# Patient Record
Sex: MALE | Race: WHITE | NOT HISPANIC OR LATINO | URBAN - METROPOLITAN AREA
[De-identification: names, ages, dates, MRNs, and addresses within clinical notes are randomized per-mention and may not be internally consistent; named-entity substitution may affect disease eponyms.]

---

## 2024-03-14 ENCOUNTER — OFFICE VISIT (OUTPATIENT)
Dept: PEDIATRICS | Facility: CLINIC | Age: 24
End: 2024-03-14
Payer: COMMERCIAL

## 2024-03-14 VITALS
HEART RATE: 60 BPM | WEIGHT: 244 LBS | SYSTOLIC BLOOD PRESSURE: 133 MMHG | TEMPERATURE: 98 F | DIASTOLIC BLOOD PRESSURE: 84 MMHG | BODY MASS INDEX: 29.51 KG/M2

## 2024-03-14 DIAGNOSIS — F41.9 ANXIETY: Primary | ICD-10-CM

## 2024-03-14 PROBLEM — F32.A ANXIETY AND DEPRESSION: Status: ACTIVE | Noted: 2024-03-14

## 2024-03-14 PROBLEM — R45.84 ANHEDONIA: Status: ACTIVE | Noted: 2024-03-14

## 2024-03-14 PROBLEM — J30.2 SEASONAL ALLERGIC RHINITIS: Status: ACTIVE | Noted: 2024-03-14

## 2024-03-14 PROCEDURE — 99213 OFFICE O/P EST LOW 20 MIN: CPT | Performed by: PEDIATRICS

## 2024-03-14 PROCEDURE — 1036F TOBACCO NON-USER: CPT | Performed by: PEDIATRICS

## 2024-03-14 RX ORDER — ESCITALOPRAM OXALATE 10 MG/1
10 TABLET ORAL DAILY
Qty: 30 TABLET | Refills: 2 | Status: SHIPPED | OUTPATIENT
Start: 2024-03-14 | End: 2024-05-15 | Stop reason: SDUPTHER

## 2024-03-14 RX ORDER — SERTRALINE HYDROCHLORIDE 50 MG/1
50 TABLET, FILM COATED ORAL DAILY
COMMUNITY
End: 2024-03-14 | Stop reason: WASHOUT

## 2024-03-14 RX ORDER — SERTRALINE HYDROCHLORIDE 100 MG/1
100 TABLET, FILM COATED ORAL NIGHTLY
COMMUNITY
End: 2024-03-14 | Stop reason: WASHOUT

## 2024-03-14 NOTE — PROGRESS NOTES
Subjective   Morgan Angeles a 23 y.o.malewho presents fordiscuss medication  HPI    Has been off sertraline and things could be better.  Has had some issues with school, trouble with focus and stems from anxiety. Does not want to see it affect him. Can get lazy, trouble investing time in things, trouble with reading. Did not notice anything with sertraline. Dandre- [employer]- philosophy/economics- wants to do sales.   Counseling 2-3 years- no real help.   Sleeps struggle- not great and wakes up tired, eats well. Hard to get up.   Not a drinker or partier, no smoking.    Objective   /84   Pulse 60   Temp 36.7 °C (98 °F)   Wt 111 kg (244 lb)   BMI 29.51 kg/m²       Physical Exam    General: Well-developed, well-nourished, alert and oriented, no acute distress  Eyes: Normal sclera, PERRLA, EOMI  ENT: Moist mucous membranes,  normal throat, no nasal discharge. TMs are normal.  Cardiac:  Normal S1/S2, no murmurs, regular rhythm. Capillary refill less than 2 seconds  Pulmonary: Clear to auscultation bilaterally, no work of breathing.  GI: normal abdomen without localization and without rebound or guarding.  Skin: No rashes  Neuro: Symmetric face, no ataxia, grossly normal strength.  Lymph: No lymphadenopathy         No visits with results within 10 Day(s) from this visit.   Latest known visit with results is:   No results found for any previous visit.         Assessment/Plan   Diagnoses and all orders for this visit:  Anxiety  -     escitalopram (Lexapro) 10 mg tablet; Take 1 tablet (10 mg) by mouth once daily.    If no better look at getting evaluated for ADHD at health center but does not sound like that

## 2024-03-14 NOTE — PATIENT INSTRUCTIONS
Diagnoses and all orders for this visit:  Anxiety  -     escitalopram (Lexapro) 10 mg tablet; Take 1 tablet (10 mg) by mouth once daily.    If no better look at getting evaluated for ADHD at health center but does not sound like that

## 2024-05-15 ENCOUNTER — TELEPHONE (OUTPATIENT)
Dept: PEDIATRICS | Facility: CLINIC | Age: 24
End: 2024-05-15
Payer: COMMERCIAL

## 2024-05-15 DIAGNOSIS — F41.9 ANXIETY: ICD-10-CM

## 2024-05-15 RX ORDER — ESCITALOPRAM OXALATE 10 MG/1
10 TABLET ORAL DAILY
Qty: 30 TABLET | Refills: 2 | Status: SHIPPED | OUTPATIENT
Start: 2024-05-15 | End: 2024-08-13

## 2024-05-15 NOTE — TELEPHONE ENCOUNTER
Mom calling for a refill for escitalopram (Lexapro) 10 mg tablet to be sent to pharmacy on file. She apologizes for late notice but Morgan is leaving out of the [place] on [place] and will need to pick this up asap.     Thank you